# Patient Record
Sex: MALE | Race: BLACK OR AFRICAN AMERICAN | NOT HISPANIC OR LATINO | Employment: FULL TIME | URBAN - METROPOLITAN AREA
[De-identification: names, ages, dates, MRNs, and addresses within clinical notes are randomized per-mention and may not be internally consistent; named-entity substitution may affect disease eponyms.]

---

## 2019-06-05 ENCOUNTER — OFFICE VISIT (OUTPATIENT)
Dept: ORTHOPEDICS | Facility: CLINIC | Age: 24
End: 2019-06-05
Payer: COMMERCIAL

## 2019-06-05 VITALS
DIASTOLIC BLOOD PRESSURE: 69 MMHG | SYSTOLIC BLOOD PRESSURE: 114 MMHG | WEIGHT: 77.56 LBS | BODY MASS INDEX: 11.49 KG/M2 | HEIGHT: 69 IN | HEART RATE: 60 BPM

## 2019-06-05 DIAGNOSIS — S63.095S: Primary | ICD-10-CM

## 2019-06-05 PROCEDURE — 3008F BODY MASS INDEX DOCD: CPT | Mod: CPTII,S$GLB,, | Performed by: ORTHOPAEDIC SURGERY

## 2019-06-05 PROCEDURE — 99213 PR OFFICE/OUTPT VISIT, EST, LEVL III, 20-29 MIN: ICD-10-PCS | Mod: S$GLB,,, | Performed by: ORTHOPAEDIC SURGERY

## 2019-06-05 PROCEDURE — 3008F PR BODY MASS INDEX (BMI) DOCUMENTED: ICD-10-PCS | Mod: CPTII,S$GLB,, | Performed by: ORTHOPAEDIC SURGERY

## 2019-06-05 PROCEDURE — 99999 PR PBB SHADOW E&M-NEW PATIENT-LVL III: CPT | Mod: PBBFAC,,, | Performed by: ORTHOPAEDIC SURGERY

## 2019-06-05 PROCEDURE — 99213 OFFICE O/P EST LOW 20 MIN: CPT | Mod: S$GLB,,, | Performed by: ORTHOPAEDIC SURGERY

## 2019-06-05 PROCEDURE — 99999 PR PBB SHADOW E&M-NEW PATIENT-LVL III: ICD-10-PCS | Mod: PBBFAC,,, | Performed by: ORTHOPAEDIC SURGERY

## 2019-06-05 NOTE — LETTER
June 5, 2019      [facility]  [address]  [id number] [id number]  Phone: [phone]       Patient: Roderick Simon   YOB: 1995  Date of Visit: 06/05/2019    To Whom It May Concern:    Bing Simon  was at [facility] on 06/05/2019. He may return to physical activities including  duty without restrictions. If you have any questions or concerns, or if I can be of further assistance, please do not hesitate to contact me.    Sincerely,    Vikas Rodriguez MD

## 2019-06-05 NOTE — PROGRESS NOTES
Mr Simon returns to clinic today.  Has a history of left wrist perilunate dislocation for which he underwent fixation.  He has been doing very well.  He has no complaints about his left wrist. He is here today for evaluation and possible release to  duty    Physical exam:  Examination left wrist reveals the incision is well healed.  There is no edema or erythema.  Palpation produces no tenderness.  Range of motion is extension of 70° of flexion of 80°.  Has full pronation and supination.  There is no midcarpal instability.  Has a 2+ radial pulse and sensation is intact.    Assessment:  Left wrist perilunate dislocation    Plan:    1.  He is released to  duty without restrictions    2.  Will follow up with me on a p.r.n. basis